# Patient Record
Sex: MALE | Race: ASIAN | NOT HISPANIC OR LATINO | URBAN - METROPOLITAN AREA
[De-identification: names, ages, dates, MRNs, and addresses within clinical notes are randomized per-mention and may not be internally consistent; named-entity substitution may affect disease eponyms.]

---

## 2017-07-31 PROBLEM — 92318000 BENIGN NEOPLASM OF RECTUM: Status: ACTIVE | Noted: 2017-07-31

## 2017-07-31 PROBLEM — 92343006 BENIGN NEOPLASM OF SIGMOID COLON: Status: ACTIVE | Noted: 2017-07-31

## 2017-07-31 PROBLEM — 91985000 BENIGN NEOPLASM OF ASCENDING COLON: Status: ACTIVE | Noted: 2017-07-31

## 2020-08-13 ENCOUNTER — OFFICE VISIT (OUTPATIENT)
Dept: URBAN - METROPOLITAN AREA CLINIC 37 | Facility: CLINIC | Age: 75
End: 2020-08-13

## 2020-08-13 VITALS — HEIGHT: 65 IN | BODY MASS INDEX: 23.32 KG/M2 | WEIGHT: 140 LBS

## 2020-08-13 PROBLEM — 59261000119100: Status: ACTIVE | Noted: 2020-08-13

## 2020-08-13 PROBLEM — 267434003: Status: ACTIVE | Noted: 2020-08-13

## 2020-08-13 PROBLEM — 428283002: Status: ACTIVE | Noted: 2020-08-13

## 2020-08-13 PROBLEM — 59621000: Status: ACTIVE | Noted: 2020-08-13

## 2020-08-13 RX ORDER — ETHAMBUTOL HYDROCHLORIDE 400 MG/1
2 TABLETS TABLET, FILM COATED ORAL QD
Status: ACTIVE | COMMUNITY

## 2020-08-13 RX ORDER — FLUTICASONE FUROATE, UMECLIDINIUM BROMIDE AND VILANTEROL TRIFENATATE 100; 62.5; 25 UG/1; UG/1; UG/1
1 PUFF POWDER RESPIRATORY (INHALATION) ONCE A DAY
Status: ACTIVE | COMMUNITY

## 2020-08-13 RX ORDER — RIFAMPIN 300 MG/1
2 TABLETS CAPSULE ORAL ONCE A DAY
Status: ACTIVE | COMMUNITY
Start: 2020-06-20

## 2020-08-13 RX ORDER — RIVAROXABAN 15 MG/1
TABLET, FILM COATED ORAL
Status: ON HOLD | COMMUNITY

## 2020-08-13 RX ORDER — PYRAZINAMIDE 500 MG/1
2 TABLETS TABLET ORAL ONCE A DAY
Status: ACTIVE | COMMUNITY

## 2020-08-13 RX ORDER — SODIUM SULFATE, POTASSIUM SULFATE, MAGNESIUM SULFATE 17.5; 3.13; 1.6 G/ML; G/ML; G/ML
AS DIRECTED SOLUTION, CONCENTRATE ORAL ONCE
Qty: 1 KIT | Refills: 0 | OUTPATIENT
Start: 2020-08-31

## 2020-08-13 RX ORDER — ERGOCALCIFEROL 1.25 MG/1
1 CAPSULE CAPSULE ORAL
Qty: 4 | Status: ACTIVE | COMMUNITY

## 2020-08-13 RX ORDER — APIXABAN 5 MG/1
AS DIRECTED TABLET, FILM COATED ORAL
Status: ACTIVE | COMMUNITY

## 2020-08-13 RX ORDER — UMECLIDINIUM 62.5 UG/1
1 PUFF AEROSOL, POWDER ORAL ONCE A DAY
Status: ON HOLD | COMMUNITY

## 2020-08-13 RX ORDER — ATORVASTATIN CALCIUM 20 MG/1
1 TABLET TABLET, FILM COATED ORAL ONCE A DAY
Status: ON HOLD | COMMUNITY

## 2020-08-13 RX ORDER — METOPROLOL TARTRATE 50 MG/1
1 TABLET TABLET, FILM COATED ORAL TWICE A DAY
Status: ACTIVE | COMMUNITY

## 2020-08-13 RX ORDER — FLECAINIDE ACETATE 100 MG/1
TABLET ORAL EVERY 12 HOURS
Status: ACTIVE | COMMUNITY

## 2020-08-13 RX ORDER — AMLODIPINE BESYLATE 5 MG/1
1 TABLET TABLET ORAL ONCE A DAY
Qty: 30 | Status: ACTIVE | COMMUNITY

## 2020-08-13 RX ORDER — LOSARTAN POTASSIUM 100 MG/1
1 TABLET TABLET ORAL ONCE A DAY
Status: ON HOLD | COMMUNITY

## 2020-08-13 RX ORDER — UREA 10 %
1 TABLET LOTION (ML) TOPICAL ONCE A DAY
Qty: 30 | Status: ACTIVE | COMMUNITY

## 2020-08-13 RX ORDER — TETRAHYDROZOLINE HCL 0.05 G/100ML
1 DROP INTO AFFECTED EYE AS NEEDED LIQUID OPHTHALMIC
Status: ON HOLD | COMMUNITY

## 2020-08-13 RX ORDER — OMEPRAZOLE 40 MG/1
1 CAPSULE CAPSULE, DELAYED RELEASE ORAL
Qty: 30 | Status: ACTIVE | COMMUNITY

## 2020-08-13 RX ORDER — BECLOMETHASONE DIPROPIONATE 40 UG/1
1 PUFF AEROSOL, METERED RESPIRATORY (INHALATION) TWICE A DAY
Status: ON HOLD | COMMUNITY

## 2020-08-13 RX ORDER — ISONIAZID 300 MG/1
1 TABLET TABLET ORAL ONCE A DAY
Qty: 10 | Status: ACTIVE | COMMUNITY
Start: 2020-06-20

## 2020-08-13 RX ORDER — ALBUTEROL SULFATE 90 UG/1
1 PUFF AS NEEDED AEROSOL, METERED RESPIRATORY (INHALATION)
Status: ACTIVE | COMMUNITY

## 2020-08-13 RX ORDER — FENOFIBRATE 54 MG/1
1 TABLET WITH FOOD TABLET ORAL QPM
Status: ACTIVE | COMMUNITY

## 2020-08-13 RX ORDER — TIOTROPIUM BROMIDE 18 UG/1
1 CAPSULE CAPSULE ORAL; RESPIRATORY (INHALATION) ONCE A DAY
Status: ON HOLD | COMMUNITY

## 2020-08-13 NOTE — HPI-MIGRATED HPI
Interim investigations : Labs done on: ->  * 01/03/2020, ordered by PCP:  - CBC: WBC: 8.0; RBC: 4.97;Hgb: 15.4; Hct: 47.0; Plt: 266 - CMP: Glu: 82; BUN: 20; Cr: 1.37; Na: 143; K: 4.0; Na: 143; K: 4.0; Alb: 4.7; ALP: 53; ALT: 19; AST: 27; Tbili: 0.9;   Interim investigations : Imaging studies: ->  * Chest X-ray, one view on 05/29/2020:  1. No pneumothorax following right lung biopsy.  2. At least two masses identified in the right lung better seen on previous CT. These remain concerning for malignancy. correlation with the bxx results when available is recommended  3. Bibasilar density could reflex atelectasis although developing pneumonia is difficult to entirely excluded.  * CT PET on 05/28/2020:  1. Nodular densities in both lungs which demonstrate hypermetabolic uptake. Additionally, there are hypermetabolic lymph nodes seen in the mediastinum and the bilateral hilar regions. The overall appearance is more consistent with an inflammatory/infectious process rather than neoplasm,. However the possibility of underlying neoplasm is not excluded and bxx recommended. No evidence of metastatic disease outside of the chest. 2. Infrarenal abdominal aortic aneurysm measuring 4.3 cm maximally;   Colorectal cancer screening : Last colonoscopy was -> 07/06/17, at which time 5 colonic polyps and multiple rectal polyps were detected and resected. Histology showed 2 of them were tubular adenoma and the rest were hyperplastic. Advised to repeat procedure in 3 years;   Colorectal cancer screening : Patients denies -> rectal bleeding, change in bowel habits, constipation, diarrhea, or abdominal pain;   Colorectal cancer screening : Current bowel movement patterns -> Can be daily or QOD;   Colorectal cancer screening : Family history of GI malignancy: -> Denies;   Colorectal cancer screening : Patient is here for -> high risk surveillance colonoscopy due to personal history of colonic polyps;     Colorectal cancer screening : Denies dysphagia or odynophagia Currently taking anticoagulants/NSAIDs: Eliquis 5 mg BID  **** Patient went to [facility] on 05/12/2020 for left heart cath  with PCI , F/U with Dr. Grande at [facility] On 05/28/20 for a CT scan where they found bilateral nodular densities in both lungs. 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 After, right lung bxx had a Chest X-ray on the same day, see below;

## 2020-08-15 ENCOUNTER — LAB OUTSIDE AN ENCOUNTER (OUTPATIENT)
Dept: URBAN - METROPOLITAN AREA CLINIC 37 | Facility: CLINIC | Age: 75
End: 2020-08-15

## 2020-09-24 ENCOUNTER — OFFICE VISIT (OUTPATIENT)
Dept: URBAN - METROPOLITAN AREA CLINIC 35 | Facility: CLINIC | Age: 75
End: 2020-09-24

## 2020-09-29 ENCOUNTER — OFFICE VISIT (OUTPATIENT)
Dept: URBAN - METROPOLITAN AREA SURGERY CENTER 8 | Facility: SURGERY CENTER | Age: 75
End: 2020-09-29

## 2020-10-12 ENCOUNTER — DASHBOARD ENCOUNTERS (OUTPATIENT)
Age: 75
End: 2020-10-12

## 2020-10-12 PROBLEM — 724538004: Status: ACTIVE | Noted: 2020-10-12

## 2020-10-12 PROBLEM — 68496003: Status: ACTIVE | Noted: 2020-10-12

## 2020-10-12 PROBLEM — 721704005 SECOND DEGREE HEMORRHOIDS: Status: ACTIVE | Noted: 2017-08-02

## 2020-10-12 PROBLEM — 39772007: Status: ACTIVE | Noted: 2020-10-12

## 2020-10-13 ENCOUNTER — OFFICE VISIT (OUTPATIENT)
Dept: URBAN - METROPOLITAN AREA CLINIC 37 | Facility: CLINIC | Age: 75
End: 2020-10-13

## 2020-10-13 VITALS — BODY MASS INDEX: 23.49 KG/M2 | HEIGHT: 65 IN | WEIGHT: 141 LBS

## 2020-10-13 RX ORDER — FLUTICASONE FUROATE, UMECLIDINIUM BROMIDE AND VILANTEROL TRIFENATATE 100; 62.5; 25 UG/1; UG/1; UG/1
1 PUFF POWDER RESPIRATORY (INHALATION) ONCE A DAY
Status: ACTIVE | COMMUNITY

## 2020-10-13 RX ORDER — ETHAMBUTOL HYDROCHLORIDE 400 MG/1
2 TABLETS TABLET, FILM COATED ORAL QD
Status: ACTIVE | COMMUNITY

## 2020-10-13 RX ORDER — FLECAINIDE ACETATE 100 MG/1
TABLET ORAL EVERY 12 HOURS
Status: ACTIVE | COMMUNITY

## 2020-10-13 RX ORDER — PYRAZINAMIDE 500 MG/1
2 TABLETS TABLET ORAL ONCE A DAY
Status: ACTIVE | COMMUNITY

## 2020-10-13 RX ORDER — UREA 10 %
1 TABLET LOTION (ML) TOPICAL ONCE A DAY
Qty: 30 | Status: ACTIVE | COMMUNITY

## 2020-10-13 RX ORDER — POLYETHYLENE GLYCOL 3350 17 G/17G
17 GM MIXED IN 10 OZ OF FLUID POWDER, FOR SOLUTION ORAL BID
Qty: 1020 GM | Refills: 5 | OUTPATIENT
Start: 2020-10-13

## 2020-10-13 RX ORDER — ISONIAZID 300 MG/1
1 TABLET TABLET ORAL ONCE A DAY
Qty: 10 | Status: ACTIVE | COMMUNITY
Start: 2020-06-20

## 2020-10-13 RX ORDER — APIXABAN 5 MG/1
AS DIRECTED TABLET, FILM COATED ORAL
Status: ACTIVE | COMMUNITY

## 2020-10-13 RX ORDER — ERGOCALCIFEROL 1.25 MG/1
1 CAPSULE CAPSULE ORAL
Qty: 4 | Status: ACTIVE | COMMUNITY

## 2020-10-13 RX ORDER — RIFAMPIN 300 MG/1
2 TABLETS CAPSULE ORAL ONCE A DAY
Status: ACTIVE | COMMUNITY
Start: 2020-06-20

## 2020-10-13 RX ORDER — AMLODIPINE BESYLATE 5 MG/1
1 TABLET TABLET ORAL ONCE A DAY
Qty: 30 | Status: ACTIVE | COMMUNITY

## 2020-10-13 RX ORDER — METOPROLOL TARTRATE 50 MG/1
1 TABLET TABLET, FILM COATED ORAL TWICE A DAY
Status: ACTIVE | COMMUNITY

## 2020-10-13 RX ORDER — FENOFIBRATE 54 MG/1
1 TABLET WITH FOOD TABLET ORAL QPM
Status: ACTIVE | COMMUNITY

## 2020-10-13 RX ORDER — ALBUTEROL SULFATE 90 UG/1
1 PUFF AS NEEDED AEROSOL, METERED RESPIRATORY (INHALATION)
Status: ACTIVE | COMMUNITY

## 2020-10-13 RX ORDER — OMEPRAZOLE 40 MG/1
1 CAPSULE CAPSULE, DELAYED RELEASE ORAL
Qty: 30 | Status: ACTIVE | COMMUNITY

## 2020-10-13 NOTE — HPI-MIGRATED HPI
Post-op OV : Patient -> denies any new changes in his/her health status since last OV. Currnet BM: has BM daily or every other day with hard stools;   Post-op OV : After Colonoscopy on -> 09/29/2020, at which time five small polyps were detected and resected. Histology showed there was a tubular adenoma, fragments of hyperplastic polyp (3) and a benign colonic mucosa with no significant histopathology. Non- bleeding external and grade II internal hemorrhoids were found during retroflexion but not banded. There was mild diverticulosis found in the ascending colon. Good quality bowel prep;   Post-op OV : Patient denies -> rectal bleeding, fever, nausea & vomiting since the procedure date;

## 2020-10-14 ENCOUNTER — TELEPHONE ENCOUNTER (OUTPATIENT)
Dept: URBAN - METROPOLITAN AREA CLINIC 35 | Facility: CLINIC | Age: 75
End: 2020-10-14

## 2020-10-14 RX ORDER — UREA 10 %
1 TABLET LOTION (ML) TOPICAL ONCE A DAY
Qty: 30 | Status: ACTIVE | COMMUNITY

## 2020-10-14 RX ORDER — FLUTICASONE FUROATE, UMECLIDINIUM BROMIDE AND VILANTEROL TRIFENATATE 100; 62.5; 25 UG/1; UG/1; UG/1
1 PUFF POWDER RESPIRATORY (INHALATION) ONCE A DAY
Status: ACTIVE | COMMUNITY

## 2020-10-14 RX ORDER — OMEPRAZOLE 40 MG/1
1 CAPSULE CAPSULE, DELAYED RELEASE ORAL
Qty: 30 | Status: ACTIVE | COMMUNITY

## 2020-10-14 RX ORDER — APIXABAN 5 MG/1
AS DIRECTED TABLET, FILM COATED ORAL
Status: ACTIVE | COMMUNITY

## 2020-10-14 RX ORDER — FENOFIBRATE 54 MG/1
1 TABLET WITH FOOD TABLET ORAL QPM
Status: ACTIVE | COMMUNITY

## 2020-10-14 RX ORDER — RIFAMPIN 300 MG/1
2 TABLETS CAPSULE ORAL ONCE A DAY
Status: ACTIVE | COMMUNITY
Start: 2020-06-20

## 2020-10-14 RX ORDER — PYRAZINAMIDE 500 MG/1
2 TABLETS TABLET ORAL ONCE A DAY
Status: ACTIVE | COMMUNITY

## 2020-10-14 RX ORDER — POLYETHYLENE GLYCOL 3350 17 G/17G
17 GM MIXED IN 10 OZ OF FLUID POWDER, FOR SOLUTION ORAL BID
Qty: 1020 GM | Refills: 5 | Status: ACTIVE | COMMUNITY
Start: 2020-10-13

## 2020-10-14 RX ORDER — METOPROLOL TARTRATE 50 MG/1
1 TABLET TABLET, FILM COATED ORAL TWICE A DAY
Status: ACTIVE | COMMUNITY

## 2020-10-14 RX ORDER — ISONIAZID 300 MG/1
1 TABLET TABLET ORAL ONCE A DAY
Qty: 10 | Status: ACTIVE | COMMUNITY
Start: 2020-06-20

## 2020-10-14 RX ORDER — ALBUTEROL SULFATE 90 UG/1
1 PUFF AS NEEDED AEROSOL, METERED RESPIRATORY (INHALATION)
Status: ACTIVE | COMMUNITY

## 2020-10-14 RX ORDER — ETHAMBUTOL HYDROCHLORIDE 400 MG/1
2 TABLETS TABLET, FILM COATED ORAL QD
Status: ACTIVE | COMMUNITY

## 2020-10-14 RX ORDER — AMLODIPINE BESYLATE 5 MG/1
1 TABLET TABLET ORAL ONCE A DAY
Qty: 30 | Status: ACTIVE | COMMUNITY

## 2020-10-14 RX ORDER — LACTULOSE 10 G/15ML
15 ML SOLUTION ORAL ONCE A DAY
Qty: 450 | Refills: 2 | OUTPATIENT
Start: 2020-10-23

## 2020-10-14 RX ORDER — FLECAINIDE ACETATE 100 MG/1
TABLET ORAL EVERY 12 HOURS
Status: ACTIVE | COMMUNITY

## 2020-10-14 RX ORDER — ERGOCALCIFEROL 1.25 MG/1
1 CAPSULE CAPSULE ORAL
Qty: 4 | Status: ACTIVE | COMMUNITY

## 2023-03-24 ENCOUNTER — OFFICE VISIT (OUTPATIENT)
Dept: URBAN - METROPOLITAN AREA CLINIC 37 | Facility: CLINIC | Age: 78
End: 2023-03-24

## 2023-03-24 NOTE — PHYSICAL EXAM CONSTITUTIONAL:
well developed, well nourished , in no acute distress , ambulating without difficulty , normal communication ability Advancement-Rotation Flap Text: The defect edges were debeveled with a #15 scalpel blade.  Given the location of the defect, shape of the defect and the proximity to free margins an advancement-rotation flap was deemed most appropriate.  Using a sterile surgical marker, an appropriate flap was drawn incorporating the defect and placing the expected incisions within the relaxed skin tension lines where possible. The area thus outlined was incised deep to adipose tissue with a #15 scalpel blade.  The skin margins were undermined to an appropriate distance in all directions utilizing iris scissors.